# Patient Record
Sex: FEMALE | Race: ASIAN | NOT HISPANIC OR LATINO | Employment: UNEMPLOYED | URBAN - METROPOLITAN AREA
[De-identification: names, ages, dates, MRNs, and addresses within clinical notes are randomized per-mention and may not be internally consistent; named-entity substitution may affect disease eponyms.]

---

## 2022-12-03 ENCOUNTER — HOSPITAL ENCOUNTER (EMERGENCY)
Facility: MEDICAL CENTER | Age: 37
End: 2022-12-03
Attending: EMERGENCY MEDICINE
Payer: COMMERCIAL

## 2022-12-03 VITALS
TEMPERATURE: 99.6 F | HEIGHT: 63 IN | WEIGHT: 130.95 LBS | SYSTOLIC BLOOD PRESSURE: 122 MMHG | RESPIRATION RATE: 16 BRPM | BODY MASS INDEX: 23.2 KG/M2 | HEART RATE: 91 BPM | DIASTOLIC BLOOD PRESSURE: 82 MMHG | OXYGEN SATURATION: 100 %

## 2022-12-03 DIAGNOSIS — H66.90 ACUTE OTITIS MEDIA, UNSPECIFIED OTITIS MEDIA TYPE: ICD-10-CM

## 2022-12-03 DIAGNOSIS — J02.9 SORE THROAT: ICD-10-CM

## 2022-12-03 DIAGNOSIS — J02.0 STREP PHARYNGITIS: ICD-10-CM

## 2022-12-03 LAB
FLUAV RNA SPEC QL NAA+PROBE: NEGATIVE
FLUBV RNA SPEC QL NAA+PROBE: NEGATIVE
RSV RNA SPEC QL NAA+PROBE: NEGATIVE
S PYO DNA SPEC NAA+PROBE: DETECTED
SARS-COV-2 RNA RESP QL NAA+PROBE: NOTDETECTED
SPECIMEN SOURCE: NORMAL

## 2022-12-03 PROCEDURE — A9270 NON-COVERED ITEM OR SERVICE: HCPCS | Performed by: EMERGENCY MEDICINE

## 2022-12-03 PROCEDURE — C9803 HOPD COVID-19 SPEC COLLECT: HCPCS | Performed by: EMERGENCY MEDICINE

## 2022-12-03 PROCEDURE — C9803 HOPD COVID-19 SPEC COLLECT: HCPCS

## 2022-12-03 PROCEDURE — 99283 EMERGENCY DEPT VISIT LOW MDM: CPT

## 2022-12-03 PROCEDURE — 0241U HCHG SARS-COV-2 COVID-19 NFCT DS RESP RNA 4 TRGT MIC: CPT

## 2022-12-03 PROCEDURE — 87651 STREP A DNA AMP PROBE: CPT

## 2022-12-03 PROCEDURE — 700102 HCHG RX REV CODE 250 W/ 637 OVERRIDE(OP): Performed by: EMERGENCY MEDICINE

## 2022-12-03 RX ORDER — IBUPROFEN 600 MG/1
600 TABLET ORAL ONCE
Status: COMPLETED | OUTPATIENT
Start: 2022-12-03 | End: 2022-12-03

## 2022-12-03 RX ORDER — AMOXICILLIN 500 MG/1
500 CAPSULE ORAL 3 TIMES DAILY
Qty: 30 CAPSULE | Refills: 0 | Status: SHIPPED | OUTPATIENT
Start: 2022-12-03 | End: 2022-12-13

## 2022-12-03 RX ORDER — AMOXICILLIN 250 MG/1
500 CAPSULE ORAL ONCE
Status: COMPLETED | OUTPATIENT
Start: 2022-12-03 | End: 2022-12-03

## 2022-12-03 RX ADMIN — IBUPROFEN 600 MG: 600 TABLET, FILM COATED ORAL at 20:52

## 2022-12-03 RX ADMIN — AMOXICILLIN 500 MG: 250 CAPSULE ORAL at 20:52

## 2022-12-04 NOTE — ED NOTES
Pt given PO med's  Taken well  Cleared for d/c  dischg instructions given to pt  Verbally understands  Aware that Rx amoxcillin was sent to listed pharmacy  She is to  in AM and take as prescribed   Instructed to f/u w/ PCP as needed as well  D/c'ed to home in NAD

## 2022-12-04 NOTE — DISCHARGE INSTRUCTIONS
Return to the ER for any worsening sore throat, worsening pain on one side of your throat, difficulty swallowing fluids or saliva, muffling of your voice, difficulty breathing, fevers over 100.4, shaking chills, worsening headache, stiff neck, rash, nausea, vomiting, or for any concerns.    Follow-up with the [facility] early this coming week.  Please call for appointment.    Take over-the-counter Tylenol and ibuprofen for discomfort.     Drink plenty of fluids to stay well-hydrated.

## 2022-12-04 NOTE — ED PROVIDER NOTES
"ED Provider Note    CHIEF COMPLAINT  Chief Complaint   Patient presents with    Sore Throat     Reports fever T max 102, sore throat, L ear pain. Denies cough, vomiting, or diarrhea. Reports known sick contacts.       HPI  Sergio Siegel is a 37 y.o. female who presents to the ER complaining of left ear pain and sore throat.  Patient states that 2 days ago she developed fever of 102, sore throat and myalgias.  The fever and myalgias since resolved, but the sore throat persist.  Yesterday she developed left ear pain.  She had intermittent headache.  No cough.  No runny nose.  Her nieces came up for Thanksgiving and were ill with upper respiratory symptoms.  Otherwise no exposures to ill contacts.  She has not vaccinated for influenza.  She has had her COVID vaccines but not her boosters.  No shortness of breath.  No rash.  No vomiting or diarrhea.  She is able to swallow fluids and saliva but it hurts to do so.    REVIEW OF SYSTEMS  See HPI for further details. All other systems are negative.    PAST MEDICAL HISTORY  Past Medical History:   Diagnosis Date    Patient denies medical problems        FAMILY HISTORY  History reviewed. No pertinent family history.    SOCIAL HISTORY  Social History     Socioeconomic History    Marital status:    Tobacco Use    Smoking status: Never    Smokeless tobacco: Never   Substance and Sexual Activity    Alcohol use: Not Currently    Drug use: Not Currently       SURGICAL HISTORY  History reviewed. No pertinent surgical history.    CURRENT MEDICATIONS  Home Medications       Reviewed by Yany Ahn R.N. (Registered Nurse) on 12/03/22 at 1948  Med List Status: Not Addressed     Medication Last Dose Status        Patient Ponce Taking any Medications                           ALLERGIES  No Known Allergies    PHYSICAL EXAM  VITAL SIGNS: BP (!) 140/104   Pulse 97   Temp 36.1 °C (97 °F) (Temporal)   Resp 16   Ht 1.6 m (5' 3\")   Wt 59.4 kg (130 lb 15.3 oz)   LMP " 10/10/2022 Comment: irregular  SpO2 100%   BMI 23.20 kg/m²      Constitutional: Well developed, well nourished; No acute distress; Non-toxic appearance.   HENT: Normocephalic, atraumatic; Bilateral external ears normal; mild erythema in the posterior oropharynx without any significant swelling.  No exudate.  Uvula is midline.  No stridor.  No drooling.  No trismus.  Right TM is clear.  Left TM is erythematous and bulging.  No tenderness over the mastoid.  Eyes: PERRL, EOMI, Conjunctiva normal. No discharge.   Neck:  Supple, nontender midline; No stridor; No nuchal rigidity.   Lymphatic: Enlarged and tender left anterior cervical lymphadenopathy noted.   Cardiovascular: Regular rate and rhythm without murmurs, rubs, or gallop.   Thorax & Lungs: No respiratory distress, breath sounds clear to auscultation bilaterally without wheezing, rales or rhonchi. Nontender chest wall. No crepitus or subcutaneous air  Abdomen: Soft, nontender, bowel sounds normal. No obvious masses; No pulsatile masses; no rebound, guarding, or peritoneal signs.   Skin: Good color; warm and dry without rash or petechia.  Back: Nontender, No CVA tenderness.   Extremities: Distal radial, dorsalis pedis, posterior tibial pulses are equal bilaterally; No edema; Nontender calves or saphenous, No cyanosis, No clubbing.   Musculoskeletal: Good range of motion in all major joints. No tenderness to palpation or major deformities noted.   Neurologic: Alert & oriented x 4, clear speech        COURSE & MEDICAL DECISION MAKING  Pertinent Labs & Imaging studies reviewed. (See chart for details)    Results for orders placed or performed during the hospital encounter of 12/03/22   CoV-2, FLU A/B, and RSV by PCR (2-4 Hours CEPHEID) : Collect NP swab in VTM    Specimen: Nasopharyngeal; Respirate   Result Value Ref Range    SARS-CoV-2 Source Nasal Swab    Group A Strep by PCR    Specimen: Throat   Result Value Ref Range    Group A Strep by PCR DETECTED (A) Not  Detected          Patient presents to the ER complaining of sore throat and left ear pain.  Symptoms began 2 days ago with myalgias and fever.  The myalgias and fever have since resolved.  Sore throat and left ear pain persist.  She has intermittent headaches, but no stiff neck, and again no fever in the last 24 hours.  No concern for meningitis.  Her left TM is infected.  Right TM is clear.  Oropharynx reveals some mild erythema without any significant swelling or exudate.  Rapid strep, flu/RSV/COVID test were obtained.  Rapid strep is positive. She will need antibiotics for her ear infection.  She will go home with prescription for amoxicillin.  She denies possibility of pregnancy.  She is tolerating p.o. fluids and she is eating and drinking normally.  At this time I think she is safe and stable for outpatient management discharge home with prescription for amoxicillin and instructions to take Tylenol and ibuprofen for the ear discomfort.  She is not septic or toxic.  She is well-hydrated.  Vital signs are normal stable.  She is afebrile here in the emergency department.  She will be given referral to primary care and has been instructed to follow-up with primary care early this coming week for recheck.  At this time I think the patient is safe and stable for outpatient management discharge home.  She has been given strict return precautions and discharge instructions and she understands treatment plan and follow-up.    FINAL IMPRESSION  1. Acute otitis media, unspecified otitis media type Acute       2. Sore throat Acute       3. Strep pharyngitis Acute             This dictation has been created using voice recognition software. The accuracy of the dictation is limited by the abilities of the software. I expect there may be some errors of grammar and possibly content. I made every attempt to manually correct the errors within my dictation. However, errors related to voice recognition software may still exist  and should be interpreted within the appropriate context.     Electronically signed by: Paty Galloway M.D., 12/3/2022 7:56 PM

## 2022-12-06 ENCOUNTER — OFFICE VISIT (OUTPATIENT)
Dept: MEDICAL GROUP | Facility: PHYSICIAN GROUP | Age: 37
End: 2022-12-06
Payer: COMMERCIAL

## 2022-12-06 VITALS
DIASTOLIC BLOOD PRESSURE: 76 MMHG | WEIGHT: 130.25 LBS | HEART RATE: 99 BPM | SYSTOLIC BLOOD PRESSURE: 112 MMHG | RESPIRATION RATE: 16 BRPM | TEMPERATURE: 98.4 F | OXYGEN SATURATION: 96 % | BODY MASS INDEX: 23.08 KG/M2 | HEIGHT: 63 IN

## 2022-12-06 DIAGNOSIS — H66.002 ACUTE SUPPURATIVE OTITIS MEDIA OF LEFT EAR WITHOUT SPONTANEOUS RUPTURE OF TYMPANIC MEMBRANE, RECURRENCE NOT SPECIFIED: ICD-10-CM

## 2022-12-06 DIAGNOSIS — Z13.6 SCREENING FOR CARDIOVASCULAR CONDITION: ICD-10-CM

## 2022-12-06 DIAGNOSIS — Z13.29 SCREENING FOR ENDOCRINE DISORDER: ICD-10-CM

## 2022-12-06 DIAGNOSIS — Z23 NEED FOR VACCINATION: ICD-10-CM

## 2022-12-06 PROBLEM — H92.02 LEFT EAR PAIN: Status: ACTIVE | Noted: 2022-12-06

## 2022-12-06 PROCEDURE — 90686 IIV4 VACC NO PRSV 0.5 ML IM: CPT | Performed by: PHYSICIAN ASSISTANT

## 2022-12-06 PROCEDURE — 99204 OFFICE O/P NEW MOD 45 MIN: CPT | Mod: 25 | Performed by: PHYSICIAN ASSISTANT

## 2022-12-06 PROCEDURE — 90471 IMMUNIZATION ADMIN: CPT | Performed by: PHYSICIAN ASSISTANT

## 2022-12-06 RX ORDER — AMOXICILLIN AND CLAVULANATE POTASSIUM 875; 125 MG/1; MG/1
1 TABLET, FILM COATED ORAL 2 TIMES DAILY
Qty: 20 TABLET | Refills: 0 | Status: SHIPPED | OUTPATIENT
Start: 2022-12-06 | End: 2022-12-16

## 2022-12-06 SDOH — ECONOMIC STABILITY: TRANSPORTATION INSECURITY
IN THE PAST 12 MONTHS, HAS THE LACK OF TRANSPORTATION KEPT YOU FROM MEDICAL APPOINTMENTS OR FROM GETTING MEDICATIONS?: NO

## 2022-12-06 SDOH — HEALTH STABILITY: PHYSICAL HEALTH: ON AVERAGE, HOW MANY MINUTES DO YOU ENGAGE IN EXERCISE AT THIS LEVEL?: 10 MIN

## 2022-12-06 SDOH — ECONOMIC STABILITY: FOOD INSECURITY: WITHIN THE PAST 12 MONTHS, YOU WORRIED THAT YOUR FOOD WOULD RUN OUT BEFORE YOU GOT MONEY TO BUY MORE.: SOMETIMES TRUE

## 2022-12-06 SDOH — ECONOMIC STABILITY: TRANSPORTATION INSECURITY
IN THE PAST 12 MONTHS, HAS LACK OF TRANSPORTATION KEPT YOU FROM MEETINGS, WORK, OR FROM GETTING THINGS NEEDED FOR DAILY LIVING?: NO

## 2022-12-06 SDOH — ECONOMIC STABILITY: INCOME INSECURITY: HOW HARD IS IT FOR YOU TO PAY FOR THE VERY BASICS LIKE FOOD, HOUSING, MEDICAL CARE, AND HEATING?: SOMEWHAT HARD

## 2022-12-06 SDOH — ECONOMIC STABILITY: HOUSING INSECURITY
IN THE LAST 12 MONTHS, WAS THERE A TIME WHEN YOU DID NOT HAVE A STEADY PLACE TO SLEEP OR SLEPT IN A SHELTER (INCLUDING NOW)?: NO

## 2022-12-06 SDOH — ECONOMIC STABILITY: FOOD INSECURITY: WITHIN THE PAST 12 MONTHS, THE FOOD YOU BOUGHT JUST DIDN'T LAST AND YOU DIDN'T HAVE MONEY TO GET MORE.: NEVER TRUE

## 2022-12-06 SDOH — HEALTH STABILITY: MENTAL HEALTH
STRESS IS WHEN SOMEONE FEELS TENSE, NERVOUS, ANXIOUS, OR CAN'T SLEEP AT NIGHT BECAUSE THEIR MIND IS TROUBLED. HOW STRESSED ARE YOU?: ONLY A LITTLE

## 2022-12-06 SDOH — ECONOMIC STABILITY: INCOME INSECURITY: IN THE LAST 12 MONTHS, WAS THERE A TIME WHEN YOU WERE NOT ABLE TO PAY THE MORTGAGE OR RENT ON TIME?: NO

## 2022-12-06 SDOH — ECONOMIC STABILITY: HOUSING INSECURITY: IN THE LAST 12 MONTHS, HOW MANY PLACES HAVE YOU LIVED?: 1

## 2022-12-06 SDOH — HEALTH STABILITY: PHYSICAL HEALTH: ON AVERAGE, HOW MANY DAYS PER WEEK DO YOU ENGAGE IN MODERATE TO STRENUOUS EXERCISE (LIKE A BRISK WALK)?: 1 DAY

## 2022-12-06 SDOH — ECONOMIC STABILITY: TRANSPORTATION INSECURITY
IN THE PAST 12 MONTHS, HAS LACK OF RELIABLE TRANSPORTATION KEPT YOU FROM MEDICAL APPOINTMENTS, MEETINGS, WORK OR FROM GETTING THINGS NEEDED FOR DAILY LIVING?: NO

## 2022-12-06 ASSESSMENT — SOCIAL DETERMINANTS OF HEALTH (SDOH)
HOW OFTEN DO YOU GET TOGETHER WITH FRIENDS OR RELATIVES?: NEVER
HOW HARD IS IT FOR YOU TO PAY FOR THE VERY BASICS LIKE FOOD, HOUSING, MEDICAL CARE, AND HEATING?: SOMEWHAT HARD
HOW MANY DRINKS CONTAINING ALCOHOL DO YOU HAVE ON A TYPICAL DAY WHEN YOU ARE DRINKING: PATIENT DOES NOT DRINK
IN A TYPICAL WEEK, HOW MANY TIMES DO YOU TALK ON THE PHONE WITH FAMILY, FRIENDS, OR NEIGHBORS?: MORE THAN THREE TIMES A WEEK
HOW OFTEN DO YOU ATTEND CHURCH OR RELIGIOUS SERVICES?: NEVER
HOW OFTEN DO YOU HAVE A DRINK CONTAINING ALCOHOL: NEVER
DO YOU BELONG TO ANY CLUBS OR ORGANIZATIONS SUCH AS CHURCH GROUPS UNIONS, FRATERNAL OR ATHLETIC GROUPS, OR SCHOOL GROUPS?: NO
HOW OFTEN DO YOU HAVE SIX OR MORE DRINKS ON ONE OCCASION: NEVER
IN A TYPICAL WEEK, HOW MANY TIMES DO YOU TALK ON THE PHONE WITH FAMILY, FRIENDS, OR NEIGHBORS?: MORE THAN THREE TIMES A WEEK
WITHIN THE PAST 12 MONTHS, YOU WORRIED THAT YOUR FOOD WOULD RUN OUT BEFORE YOU GOT THE MONEY TO BUY MORE: SOMETIMES TRUE
HOW OFTEN DO YOU ATTENT MEETINGS OF THE CLUB OR ORGANIZATION YOU BELONG TO?: NEVER
HOW OFTEN DO YOU GET TOGETHER WITH FRIENDS OR RELATIVES?: NEVER
DO YOU BELONG TO ANY CLUBS OR ORGANIZATIONS SUCH AS CHURCH GROUPS UNIONS, FRATERNAL OR ATHLETIC GROUPS, OR SCHOOL GROUPS?: NO
HOW OFTEN DO YOU ATTEND CHURCH OR RELIGIOUS SERVICES?: NEVER
HOW OFTEN DO YOU ATTENT MEETINGS OF THE CLUB OR ORGANIZATION YOU BELONG TO?: NEVER

## 2022-12-06 ASSESSMENT — ENCOUNTER SYMPTOMS
CHILLS: 0
WEIGHT LOSS: 0
SORE THROAT: 0
FALLS: 0
BRUISES/BLEEDS EASILY: 0
DEPRESSION: 0
HEADACHES: 0
FEVER: 0
BLURRED VISION: 0
MYALGIAS: 0
NAUSEA: 0
WEAKNESS: 0
VOMITING: 0
DIARRHEA: 0
SHORTNESS OF BREATH: 0
ORTHOPNEA: 0
COUGH: 0
NERVOUS/ANXIOUS: 0
PALPITATIONS: 0
DIAPHORESIS: 0
DIZZINESS: 0
ABDOMINAL PAIN: 0

## 2022-12-06 ASSESSMENT — LIFESTYLE VARIABLES
HOW MANY STANDARD DRINKS CONTAINING ALCOHOL DO YOU HAVE ON A TYPICAL DAY: PATIENT DOES NOT DRINK
SKIP TO QUESTIONS 9-10: 1
HOW OFTEN DO YOU HAVE SIX OR MORE DRINKS ON ONE OCCASION: NEVER
HOW OFTEN DO YOU HAVE A DRINK CONTAINING ALCOHOL: NEVER
AUDIT-C TOTAL SCORE: 0

## 2022-12-06 ASSESSMENT — PATIENT HEALTH QUESTIONNAIRE - PHQ9: CLINICAL INTERPRETATION OF PHQ2 SCORE: 0

## 2022-12-06 NOTE — LETTER
Replaced by [facility]  Elizabeth Dillon P.A.-C.  [address]  [place] [address]  Fax: [phone]   Authorization for Release/Disclosure of   Protected Health Information   Name: SERGIO SIEGEL : 1985 SSN: [id number]   Address: [address] [place] [address]  [place] [address] Phone:    [phone] (home)    I authorize the entity listed below to release/disclose the PHI below to:   Replaced by [facility]/Elizabeth Dillon P.A.-C. and Elizabeth Dillon P.A.-C.   Provider or Entity Name:     Address   City, State, Zip   Phone:      Fax:     Reason for request: continuity of care   Information to be released:    [  ] LAST COLONOSCOPY,  including any PATH REPORT and follow-up  [  ] LAST FIT/COLOGUARD RESULT [  ] LAST DEXA  [  ] LAST MAMMOGRAM  [  ] LAST PAP  [  ] LAST LABS [  ] RETINA EXAM REPORT  [  ] IMMUNIZATION RECORDS  [  ] Release all info      [  ] Check here and initial the line next to each item to release ALL health information INCLUDING  _____ Care and treatment for drug and / or alcohol abuse  _____ HIV testing, infection status, or AIDS  _____ Genetic Testing    DATES OF SERVICE OR TIME PERIOD TO BE DISCLOSED: _____________  I understand and acknowledge that:  * This Authorization may be revoked at any time by you in writing, except if your health information has already been used or disclosed.  * Your health information that will be used or disclosed as a result of you signing this authorization could be re-disclosed by the recipient. If this occurs, your re-disclosed health information may no longer be protected by State or Federal laws.  * You may refuse to sign this Authorization. Your refusal will not affect your ability to obtain treatment.  * This Authorization becomes effective upon signing and will  on (date) __________.      If no date is indicated, this Authorization will  one (1) year from the signature date.    Name: Sergio Siegel    Signature:   Date:     2022       PLEASE FAX  REQUESTED RECORDS BACK TO: [phone]

## 2022-12-06 NOTE — PROGRESS NOTES
"Subjective:     CC: Establish as a new patient, left ear pain    HPI:   Sergio presents today with    Problem   Left Ear Pain    Acute, stable.  Started 4 days ago.  Seen in ER, given amoxicillin.  Better but still hurting.  Having some bloody drainage.  Difficult hearing.  Tested +strep, throat is feeling feeling better.         Health Maintenance: Completed    ROS:  Review of Systems   Constitutional:  Negative for chills, diaphoresis, fever, malaise/fatigue and weight loss.   HENT:  Positive for ear pain (Left). Negative for hearing loss and sore throat.    Eyes:  Negative for blurred vision.   Respiratory:  Negative for cough and shortness of breath.    Cardiovascular:  Negative for chest pain, palpitations, orthopnea and leg swelling.   Gastrointestinal:  Negative for abdominal pain, diarrhea, nausea and vomiting.   Genitourinary:  Negative for dysuria, frequency, hematuria and urgency.   Musculoskeletal:  Negative for falls, joint pain and myalgias.   Skin:  Negative for rash.   Neurological:  Negative for dizziness, weakness and headaches.   Endo/Heme/Allergies:  Does not bruise/bleed easily.   Psychiatric/Behavioral:  Negative for depression. The patient is not nervous/anxious.      Objective:     Exam:  /76 (BP Location: Right arm, Patient Position: Sitting, BP Cuff Size: Adult)   Pulse 99   Temp 36.9 °C (98.4 °F) (Temporal)   Resp 16   Ht 1.6 m (5' 3\")   Wt 59.1 kg (130 lb 4 oz)   LMP 12/05/2022 (Exact Date)   SpO2 96%   Breastfeeding No   BMI 23.07 kg/m²  Body mass index is 23.07 kg/m².    Physical Exam  Constitutional:       Appearance: Normal appearance.   HENT:      Head: Normocephalic and atraumatic.      Right Ear: Tympanic membrane, ear canal and external ear normal.      Ears:      Comments: Left TM is markedly erythematous with loss of landmarks.  Small amount of bright red blood is noted in the left EAC without surrounding edema, erythema, or drainage.     Mouth/Throat:      " Mouth: Mucous membranes are moist.      Pharynx: Oropharynx is clear. No oropharyngeal exudate or posterior oropharyngeal erythema.   Eyes:      Extraocular Movements: Extraocular movements intact.      Conjunctiva/sclera: Conjunctivae normal.      Pupils: Pupils are equal, round, and reactive to light.   Cardiovascular:      Rate and Rhythm: Normal rate and regular rhythm.      Heart sounds: Normal heart sounds.   Pulmonary:      Effort: Pulmonary effort is normal.      Breath sounds: Normal breath sounds.   Abdominal:      General: Abdomen is flat. Bowel sounds are normal. There is no distension.      Palpations: Abdomen is soft. There is no mass.      Tenderness: There is no abdominal tenderness. There is no guarding.   Musculoskeletal:      Cervical back: Normal range of motion and neck supple.   Skin:     General: Skin is warm and dry.   Neurological:      General: No focal deficit present.      Mental Status: She is alert and oriented to person, place, and time.   Psychiatric:         Mood and Affect: Mood normal.           Assessment & Plan:     37 y.o. female with the following -     1. Acute suppurative otitis media of left ear without spontaneous rupture of tympanic membrane, recurrence not specified  Acute, stable.  Stop amoxicillin 500 mg 3 times a day.  Start Augmentin 875/125, twice daily for 10 days.  Follow-up for reevaluation after completion of antibiotics.    - amoxicillin-clavulanate (AUGMENTIN) 875-125 MG Tab; Take 1 Tablet by mouth 2 times a day for 10 days.  Dispense: 20 Tablet; Refill: 0  - CBC WITH DIFFERENTIAL; Future  - Comp Metabolic Panel; Future    2. Screening for cardiovascular condition    - Lipid Profile; Future    3. Screening for endocrine disorder    - TSH WITH REFLEX TO FT4; Future    4. Need for vaccination    - INFLUENZA VACCINE QUAD INJ (PF)        Return in about 1 month (around 1/6/2023) for discuss labs, med check.    Please note that this dictation was created using voice  recognition software. I have made every reasonable attempt to correct obvious errors, but I expect that there are errors of grammar and possibly content that I did not discover before finalizing the note.

## 2023-01-20 ENCOUNTER — HOSPITAL ENCOUNTER (OUTPATIENT)
Dept: LAB | Facility: MEDICAL CENTER | Age: 38
End: 2023-01-20
Attending: PHYSICIAN ASSISTANT

## 2023-01-20 DIAGNOSIS — H66.002 ACUTE SUPPURATIVE OTITIS MEDIA OF LEFT EAR WITHOUT SPONTANEOUS RUPTURE OF TYMPANIC MEMBRANE, RECURRENCE NOT SPECIFIED: ICD-10-CM

## 2023-01-20 DIAGNOSIS — Z13.6 SCREENING FOR CARDIOVASCULAR CONDITION: ICD-10-CM

## 2023-01-20 DIAGNOSIS — Z13.29 SCREENING FOR ENDOCRINE DISORDER: ICD-10-CM

## 2023-01-20 LAB
ALBUMIN SERPL BCP-MCNC: 4.3 G/DL (ref 3.2–4.9)
ALBUMIN/GLOB SERPL: 1.3 G/DL
ALP SERPL-CCNC: 59 U/L (ref 30–99)
ALT SERPL-CCNC: 13 U/L (ref 2–50)
ANION GAP SERPL CALC-SCNC: 11 MMOL/L (ref 7–16)
AST SERPL-CCNC: 19 U/L (ref 12–45)
BASOPHILS # BLD AUTO: 0.3 % (ref 0–1.8)
BASOPHILS # BLD: 0.02 K/UL (ref 0–0.12)
BILIRUB SERPL-MCNC: 0.2 MG/DL (ref 0.1–1.5)
BUN SERPL-MCNC: 14 MG/DL (ref 8–22)
CALCIUM ALBUM COR SERPL-MCNC: 9.1 MG/DL (ref 8.5–10.5)
CALCIUM SERPL-MCNC: 9.3 MG/DL (ref 8.5–10.5)
CHLORIDE SERPL-SCNC: 106 MMOL/L (ref 96–112)
CHOLEST SERPL-MCNC: 150 MG/DL (ref 100–199)
CO2 SERPL-SCNC: 22 MMOL/L (ref 20–33)
CREAT SERPL-MCNC: 0.65 MG/DL (ref 0.5–1.4)
EOSINOPHIL # BLD AUTO: 0.08 K/UL (ref 0–0.51)
EOSINOPHIL NFR BLD: 1.3 % (ref 0–6.9)
ERYTHROCYTE [DISTWIDTH] IN BLOOD BY AUTOMATED COUNT: 41.5 FL (ref 35.9–50)
FASTING STATUS PATIENT QL REPORTED: NORMAL
GFR SERPLBLD CREATININE-BSD FMLA CKD-EPI: 116 ML/MIN/1.73 M 2
GLOBULIN SER CALC-MCNC: 3.2 G/DL (ref 1.9–3.5)
GLUCOSE SERPL-MCNC: 84 MG/DL (ref 65–99)
HCT VFR BLD AUTO: 41.2 % (ref 37–47)
HDLC SERPL-MCNC: 70 MG/DL
HGB BLD-MCNC: 13 G/DL (ref 12–16)
IMM GRANULOCYTES # BLD AUTO: 0.02 K/UL (ref 0–0.11)
IMM GRANULOCYTES NFR BLD AUTO: 0.3 % (ref 0–0.9)
LDLC SERPL CALC-MCNC: 69 MG/DL
LYMPHOCYTES # BLD AUTO: 1.95 K/UL (ref 1–4.8)
LYMPHOCYTES NFR BLD: 32 % (ref 22–41)
MCH RBC QN AUTO: 25.3 PG (ref 27–33)
MCHC RBC AUTO-ENTMCNC: 31.6 G/DL (ref 33.6–35)
MCV RBC AUTO: 80.2 FL (ref 81.4–97.8)
MONOCYTES # BLD AUTO: 0.36 K/UL (ref 0–0.85)
MONOCYTES NFR BLD AUTO: 5.9 % (ref 0–13.4)
NEUTROPHILS # BLD AUTO: 3.67 K/UL (ref 2–7.15)
NEUTROPHILS NFR BLD: 60.2 % (ref 44–72)
NRBC # BLD AUTO: 0 K/UL
NRBC BLD-RTO: 0 /100 WBC
PLATELET # BLD AUTO: 273 K/UL (ref 164–446)
PMV BLD AUTO: 11.7 FL (ref 9–12.9)
POTASSIUM SERPL-SCNC: 4.6 MMOL/L (ref 3.6–5.5)
PROT SERPL-MCNC: 7.5 G/DL (ref 6–8.2)
RBC # BLD AUTO: 5.14 M/UL (ref 4.2–5.4)
SODIUM SERPL-SCNC: 139 MMOL/L (ref 135–145)
TRIGL SERPL-MCNC: 56 MG/DL (ref 0–149)
TSH SERPL DL<=0.005 MIU/L-ACNC: 3.22 UIU/ML (ref 0.38–5.33)
WBC # BLD AUTO: 6.1 K/UL (ref 4.8–10.8)

## 2023-01-20 PROCEDURE — 85025 COMPLETE CBC W/AUTO DIFF WBC: CPT

## 2023-01-20 PROCEDURE — 80053 COMPREHEN METABOLIC PANEL: CPT

## 2023-01-20 PROCEDURE — 80061 LIPID PANEL: CPT

## 2023-01-20 PROCEDURE — 36415 COLL VENOUS BLD VENIPUNCTURE: CPT

## 2023-01-20 PROCEDURE — 84443 ASSAY THYROID STIM HORMONE: CPT

## 2023-12-01 ENCOUNTER — OFFICE VISIT (OUTPATIENT)
Dept: URGENT CARE | Facility: CLINIC | Age: 38
End: 2023-12-01
Payer: COMMERCIAL

## 2023-12-01 VITALS
TEMPERATURE: 97.7 F | OXYGEN SATURATION: 99 % | DIASTOLIC BLOOD PRESSURE: 84 MMHG | HEART RATE: 103 BPM | HEIGHT: 62 IN | WEIGHT: 140.6 LBS | RESPIRATION RATE: 16 BRPM | BODY MASS INDEX: 25.88 KG/M2 | SYSTOLIC BLOOD PRESSURE: 112 MMHG

## 2023-12-01 DIAGNOSIS — J06.9 UPPER RESPIRATORY TRACT INFECTION, UNSPECIFIED TYPE: ICD-10-CM

## 2023-12-01 LAB — S PYO DNA SPEC NAA+PROBE: NOT DETECTED

## 2023-12-01 PROCEDURE — 3079F DIAST BP 80-89 MM HG: CPT

## 2023-12-01 PROCEDURE — 99213 OFFICE O/P EST LOW 20 MIN: CPT

## 2023-12-01 PROCEDURE — 87651 STREP A DNA AMP PROBE: CPT

## 2023-12-01 PROCEDURE — 3074F SYST BP LT 130 MM HG: CPT

## 2023-12-01 RX ORDER — ACETAMINOPHEN 325 MG/1
650 TABLET ORAL EVERY 4 HOURS PRN
COMMUNITY

## 2023-12-01 RX ORDER — BENZONATATE 100 MG/1
100 CAPSULE ORAL 3 TIMES DAILY PRN
Qty: 30 CAPSULE | Refills: 0 | Status: SHIPPED | OUTPATIENT
Start: 2023-12-01 | End: 2023-12-11

## 2023-12-01 RX ORDER — DEXTROMETHORPHAN HYDROBROMIDE AND PROMETHAZINE HYDROCHLORIDE 15; 6.25 MG/5ML; MG/5ML
5 SYRUP ORAL EVERY 6 HOURS PRN
Qty: 100 ML | Refills: 0 | Status: SHIPPED | OUTPATIENT
Start: 2023-12-01

## 2023-12-01 ASSESSMENT — FIBROSIS 4 INDEX: FIB4 SCORE: 0.73

## 2023-12-01 NOTE — PROGRESS NOTES
"Chief Complaint   Patient presents with    Coronavirus Screening     Took an at-home Covid test and was positive.  Hi , I did Covid test at home in the morning. Starting feeling sick November 23 with symptoms of coughing, sore throat, fever at night, body aches, fatigue.         Subjective:   HISTORY OF PRESENT ILLNESS: Sergio Siegel is a 38 y.o. female who presents for cough and sore throat.  Tested positive for covid this morning but has had symptoms since the 23rd. She reports ongoing sore throat and fevers.   Patient denies inability to swallow, voice changes, neck pain or difficulty opening his mouth.       Medications, Allergies, current problem list, Social and Family history reviewed today in Epic.     Objective:     /84 (BP Location: Left arm, Patient Position: Sitting, BP Cuff Size: Adult)   Pulse (!) 103   Temp 36.5 °C (97.7 °F) (Temporal)   Resp 16   Ht 1.58 m (5' 2.21\")   Wt 63.8 kg (140 lb 9.6 oz)   SpO2 99%     Physical Exam  Vitals reviewed.   Constitutional:       Appearance: Normal appearance. She is not toxic-appearing.   HENT:      Head:      Jaw: No trismus.      Right Ear: Tympanic membrane normal.      Left Ear: Tympanic membrane normal.      Nose: Rhinorrhea present.      Mouth/Throat:      Mouth: Mucous membranes are moist.      Pharynx: Uvula midline. Posterior oropharyngeal erythema present. No pharyngeal swelling, oropharyngeal exudate or uvula swelling.      Tonsils: No tonsillar exudate or tonsillar abscesses. 1+ on the right. 1+ on the left.      Comments: No muffled voice, trismus, unilateral deviation of the uvula, soft palate fullness or edema. No oral airway compromise, or drooling noted.   Eyes:      Conjunctiva/sclera: Conjunctivae normal.   Cardiovascular:      Rate and Rhythm: Normal rate and regular rhythm.      Heart sounds: Normal heart sounds.   Pulmonary:      Effort: Pulmonary effort is normal. No respiratory distress.      Breath sounds: Normal breath " sounds. No decreased breath sounds or wheezing.   Musculoskeletal:      Cervical back: Full passive range of motion without pain and neck supple.   Skin:     General: Skin is warm and dry.   Neurological:      Mental Status: She is alert and oriented to person, place, and time.   Psychiatric:         Mood and Affect: Mood normal.          Assessment/Plan:     Diagnosis and associated orders    I personally reviewed prior external notes and test results pertinent to today's visit.     1. Upper respiratory tract infection, unspecified type  POCT CEPHEID GROUP A STREP - PCR            IMPRESSION:  Exam findings reassuring with stable vital signs, No red flag symptoms or exam findings. Strep testing is negative.  Explained that S/S are consistent with a viral illness and are self limiting.  No antibiotics are indicated at this time.     OTC symptomatic relief measures were recommended.  Supportive care also discussed to include the use of warm salt water gargles, saline nasal rinses, steam inhalation, and the use of a cool-mist humidifier in the bedroom at night.      Differential diagnosis discussed. Pt was Educated on red flag symptoms. Pt has been Instructed to return to Urgent Care or nearest Emergency Department if symptoms fail to improve, for any change in condition, further concerns, or new concerning symptoms. Patient states understanding of the plan of care and discharge instructions.  They are discharged in stable condition.         Please note that this dictation was created using voice recognition software. I have made a reasonable attempt to correct obvious errors, but I expect that there are errors of grammar and possibly content that I did not discover before finalizing the note.    This note was electronically signed by TERELL Barton

## 2024-11-04 ENCOUNTER — OFFICE VISIT (OUTPATIENT)
Dept: MEDICAL GROUP | Facility: PHYSICIAN GROUP | Age: 39
End: 2024-11-04
Payer: COMMERCIAL

## 2024-11-04 VITALS
OXYGEN SATURATION: 97 % | DIASTOLIC BLOOD PRESSURE: 72 MMHG | HEIGHT: 62 IN | BODY MASS INDEX: 26.68 KG/M2 | SYSTOLIC BLOOD PRESSURE: 106 MMHG | HEART RATE: 90 BPM | WEIGHT: 145 LBS | TEMPERATURE: 97.4 F | RESPIRATION RATE: 18 BRPM

## 2024-11-04 DIAGNOSIS — G44.219 EPISODIC TENSION-TYPE HEADACHE, NOT INTRACTABLE: ICD-10-CM

## 2024-11-04 DIAGNOSIS — Z23 NEED FOR VACCINATION: ICD-10-CM

## 2024-11-04 DIAGNOSIS — N83.209 CYST OF OVARY, UNSPECIFIED LATERALITY: ICD-10-CM

## 2024-11-04 PROCEDURE — 3074F SYST BP LT 130 MM HG: CPT | Performed by: PHYSICIAN ASSISTANT

## 2024-11-04 PROCEDURE — 3078F DIAST BP <80 MM HG: CPT | Performed by: PHYSICIAN ASSISTANT

## 2024-11-04 PROCEDURE — 90656 IIV3 VACC NO PRSV 0.5 ML IM: CPT | Performed by: PHYSICIAN ASSISTANT

## 2024-11-04 PROCEDURE — 99213 OFFICE O/P EST LOW 20 MIN: CPT | Mod: 25 | Performed by: PHYSICIAN ASSISTANT

## 2024-11-04 PROCEDURE — 90471 IMMUNIZATION ADMIN: CPT | Performed by: PHYSICIAN ASSISTANT

## 2024-11-04 RX ORDER — SUMATRIPTAN 50 MG/1
50 TABLET, FILM COATED ORAL
Qty: 10 TABLET | Refills: 3 | Status: SHIPPED | OUTPATIENT
Start: 2024-11-04

## 2024-11-04 ASSESSMENT — PATIENT HEALTH QUESTIONNAIRE - PHQ9: CLINICAL INTERPRETATION OF PHQ2 SCORE: 0

## 2024-11-04 ASSESSMENT — FIBROSIS 4 INDEX: FIB4 SCORE: 0.73

## 2024-11-04 NOTE — PROGRESS NOTES
"Verbal consent was acquired by the patient to use [employer] ambient listening note generation during this visit     Subjective:     HPI:   History of Present Illness  The patient is a 38-year-old female here to discuss headaches and lower abdominal pain.    She experiences monthly headaches, typically coinciding with her menstrual cycle. These headaches, which last for the duration of her period, are bilateral and occasionally shift from one side to the other after medication. She manages the pain with ibuprofen and sometimes uses migraine-specific medication, which provides inconsistent relief. She does not experience nausea with these headaches. She has a history of using birth control pills for an ovarian cyst but discontinued them a year later due to weight gain. Interestingly, she did not experience headaches while on birth control.    She began experiencing lower abdominal pain three days ago, in the same area where she had surgery for a leiomyoma 13 years ago. An ultrasound conducted three years ago revealed an ovarian cyst, which occasionally causes discomfort during her periods. She has been taking ibuprofen for the pain. The current pain feels similar to the cyst pain. She was advised to have an ultrasound every six months but has not followed this recommendation. The pain is severe enough to prevent her from standing at work. She does not currently have a gynecologist and is seeking a referral.    Health Maintenance: Completed    ROS:  Gen: no fevers/chills  Eyes: no changes in vision  ENT: no sore throat  Pulm: no sob, no cough  CV: no chest pain  GI: no nausea/vomiting  : no dysuria  MSk: no myalgias  Skin: no rash  Neuro: no headaches  Psych: no depression, no anxiety    Objective:     Exam:  /72   Pulse 90   Temp 36.3 °C (97.4 °F) (Temporal)   Resp 18   Ht 1.58 m (5' 2.21\")   Wt 65.8 kg (145 lb)   LMP 10/24/2024 (Exact Date)   SpO2 97%   BMI 26.34 kg/m²  Body mass index is 26.34 " kg/m².    PHYSICAL EXAM  Constitutional: Alert, no distress, well-groomed.  Skin: Warm, dry, good turgor, no rashes in visible areas.  Eye: Equal, round and reactive, conjunctiva clear, lids normal.  ENMT: Lips without lesions, good dentition, moist mucous membranes.  Neck: Trachea midline, no masses, no thyromegaly.  Respiratory: Unlabored respiratory effort, no cough.  MSK: Normal gait, moves all extremities.  Abdomen: bowel sounds present, soft, nondistended, mildly tender lower quadrants, negative McBurney,  no peritoneal signs, no CVA tenderness bilaterally, no hepatosplenomegaly  Neuro: Grossly non-focal.   Psych: Alert and oriented x3, normal affect and mood.    Results      Assessment & Plan:     1. Episodic tension-type headache, not intractable  SUMAtriptan (IMITREX) 50 MG Tab      2. Cyst of ovary, unspecified laterality  US-PELVIC COMPLETE (TRANSABDOMINAL/TRANSVAGINAL) (COMBO)    Referral to Gynecology      3. Need for vaccination  INFLUENZA VACCINE TRI INJ (PF)          Assessment & Plan  1. Headaches.  Chronic, not well-controlled.  The headaches are occurring cyclically around her menstrual period. She was advised to maintain adequate hydration and continue with ibuprofen for more mild symptoms. For more severe symptoms, a prescription for sumatriptan was provided, to be taken at the onset of a headache. If the headache persists after 2 hours, a second dose can be taken.  Educated about how to take the medication as well as potential side effects.  Birth control pills were discussed as a potential treatment option, but the patient prefers not to use them due to past side effects, though she previously did not experience these headaches while she was on OCPs.  Follow-up for new or worsening symptoms.    2. Lower abdominal pain.  Patient has a history of recurrent ovarian cysts and was supposed to be getting ultrasounds every 6 months.  Has been over a year and a half since her previous ultrasound so  updated imaging ordered today. A referral to a gynecologist was also made, and she was advised to schedule a Pap smear. If the ultrasound results are unremarkable, the source of the pain will need to be further investigated, but patient is in agreement with the plan and thinks this is related to ovarian cysts as well.  Will determine follow-up based on the results.            I spent a total of 25 minutes with record review, exam, communication with the patient, communication with other providers, and documentation of this encounter.    Please note that this dictation was created using voice recognition software. I have made every reasonable attempt to correct obvious errors, but I expect that there are errors of grammar and possibly content that I did not discover before finalizing the note.

## 2024-11-27 ENCOUNTER — HOSPITAL ENCOUNTER (OUTPATIENT)
Dept: RADIOLOGY | Facility: MEDICAL CENTER | Age: 39
End: 2024-11-27
Attending: PHYSICIAN ASSISTANT
Payer: COMMERCIAL

## 2024-11-27 DIAGNOSIS — N83.209 CYST OF OVARY, UNSPECIFIED LATERALITY: ICD-10-CM

## 2024-11-27 PROCEDURE — 76830 TRANSVAGINAL US NON-OB: CPT

## 2024-12-10 NOTE — PROGRESS NOTES
ANNUAL GYNECOLOGY VISIT    Chief Complaint  New Patient and Annual Exam      Subjective     Subjective  Sergio Siegel is a 39 y.o. female who presents today for Annual Exam. Her main concern today is pain during her menses. Her menses are regular, lasting five days. During her decribes 8/10 bilateral pelvic pain during her menses which she takes tylenol, motrin, and heating pads. Uses four pads on heaviest days. No pain between periods or with bowel movements. She had an US performed recently with a 1.8cm dominant follicle and she's concerned that is the cause of her pain. She's had hx of ovarian cysts which were being followed by US and she previously used OCPs for prevention, but discontinued. She also has hx of a abdominal myomectomy for a 6in fibroid performed in [facility]. Denies hx of VTE or HTN. Does have monthly headaches associated with her menses. Describes the pain as unilateral, but will shift between sides of her head. Denies nausea, but did have one episode where the pain was focused around her left eye and caused blurriness. Has no other symptoms of aura. Her headaches improved while taking OCPs previously.     Preventive Care   Immunization History   Administered Date(s) Administered    Cholera Vaccine - HISTORICAL DATA 03/15/2019    Hepatitis A Vaccine, Adult 03/15/2019, 10/10/2019    Influenza Vaccine Quad Inj (Pf) 12/14/2019, 12/06/2022    Influenza split virus trivalent (PF) 11/04/2024    Japanese Encephalitis 03/15/2019, 03/26/2019    MODERNA SARS-COV-2 VACCINE (12+) 05/21/2021, 06/21/2021    Tdap Vaccine 07/08/2016    Typhoid Vaccine (Oral) - HISTORICAL DATA 03/15/2019    Varicella Vaccine Live 07/08/2016     Gynecology History and ROS  Current Sexual Activity: Yes  History of sexually transmitted diseases?  no  Abnormal discharge? No  Current Contraception: none    Menstrual History  Patient's last menstrual period was 11/24/2024.  Periods are regular, lasting 5 days.   Clots or heavy flow:  No  Dysmenorrhea: Yes  Intermenstrual bleeding/spotting: No  Significant pain with periods:Yes  Bothersome PMS symptoms: No    Pap History  Last pap smear:  2019 wnl  History of moderate or severe dysplasia: No    Cancer Risk Assessement:  Family history of:   - Breast cancer: no   - Ovarian cancer: no   - Uterine cancer: no   - Colon cancer: no    Obstetric History  OB History    Para Term  AB Living   0 0 0 0 0 0   SAB IAB Ectopic Molar Multiple Live Births   0 0 0 0 0 0       Past Medical History  Past Medical History:   Diagnosis Date    Patient denies medical problems        Past Surgical History  Past Surgical History:   Procedure Laterality Date    MYOMECTOMY      around        Social History  Social History     Socioeconomic History    Marital status:      Spouse name: Not on file    Number of children: Not on file    Years of education: Not on file    Highest education level: Bachelor's degree (e.g., BA, AB, BS)   Occupational History    Not on file   Tobacco Use    Smoking status: Never    Smokeless tobacco: Never   Vaping Use    Vaping status: Never Used   Substance and Sexual Activity    Alcohol use: Yes     Comment: Socially    Drug use: Never    Sexual activity: Yes     Partners: Male     Birth control/protection: None   Other Topics Concern    Not on file   Social History Narrative    Not on file     Social Drivers of Health     Financial Resource Strain: Medium Risk (2022)    Overall Financial Resource Strain (CARDIA)     Difficulty of Paying Living Expenses: Somewhat hard   Food Insecurity: Food Insecurity Present (2022)    Hunger Vital Sign     Worried About Running Out of Food in the Last Year: Sometimes true     Ran Out of Food in the Last Year: Never true   Transportation Needs: No Transportation Needs (2022)    PRAPARE - Transportation     Lack of Transportation (Medical): No     Lack of Transportation (Non-Medical): No   Physical Activity: Insufficiently  Active (12/6/2022)    Exercise Vital Sign     Days of Exercise per Week: 1 day     Minutes of Exercise per Session: 10 min   Stress: No Stress Concern Present (12/6/2022)    [employer] [place] [employer] - Occupational Stress Questionnaire     Feeling of Stress : Only a little   Social Connections: Moderately Isolated (12/6/2022)    Social Connection and Isolation Panel [NHANES]     Frequency of Communication with Friends and Family: More than three times a week     Frequency of Social Gatherings with Friends and Family: Never     Attends Hinduism Services: Never     Active Member of Clubs or Organizations: No     Attends Club or Organization Meetings: Never     Marital Status:    Intimate Partner Violence: Not on file   Housing Stability: Low Risk  (12/6/2022)    Housing Stability Vital Sign     Unable to Pay for Housing in the Last Year: No     Number of Places Lived in the Last Year: 1     Unstable Housing in the Last Year: No       Family History  Family History   Problem Relation Age of Onset    Hypertension Mother     Hyperlipidemia Mother     Diabetes Mother     No Known Problems Father     No Known Problems Brother     No Known Problems Maternal Grandmother     Heart Disease Maternal Grandfather     No Known Problems Paternal Grandmother     No Known Problems Paternal Grandfather     Cancer Neg Hx     Ovarian Cancer Neg Hx     Tubal Cancer Neg Hx     Peritoneal Cancer Neg Hx     Colorectal Cancer Neg Hx     Breast Cancer Neg Hx     Bilateral Breast Cancer Neg Hx        Home Medications  Current Outpatient Medications on File Prior to Visit   Medication Sig Dispense Refill    SUMAtriptan (IMITREX) 50 MG Tab Take 1 Tablet by mouth one time as needed for Migraine for up to 1 dose. 10 Tablet 3    acetaminophen (TYLENOL) 325 MG Tab Take 650 mg by mouth every four hours as needed.       No current facility-administered medications on file prior to visit.       Allergies/Reactions  No Known  "Allergies    ROS  Review of Systems   All other systems reviewed and are negative.      Objective     Physical Examination:  Vital Signs:   Vitals:    12/11/24 0848   BP: 123/79   BP Location: Right arm   Patient Position: Sitting   BP Cuff Size: Adult   Weight: 143 lb   Height: 5' 3\"     Body mass index is 25.33 kg/m².    Physical Exam  Exam conducted with a chaperone present.   Constitutional:       General: She is not in acute distress.     Appearance: Normal appearance. She is normal weight.   Eyes:      Conjunctiva/sclera: Conjunctivae normal.   Pulmonary:      Effort: Pulmonary effort is normal.   Chest:   Breasts:     Right: Normal.      Left: Normal.   Abdominal:      General: Abdomen is flat. There is no distension.      Palpations: Abdomen is soft. There is no mass.      Tenderness: There is no abdominal tenderness.      Comments: Pfannenstiel incision well healed   Genitourinary:     General: Normal vulva.      Vagina: Normal.      Cervix: Normal.      Uterus: Normal. Not enlarged, not fixed and not tender.       Adnexa: Right adnexa normal and left adnexa normal.   Skin:     General: Skin is warm and dry.      Capillary Refill: Capillary refill takes less than 2 seconds.   Neurological:      General: No focal deficit present.      Mental Status: She is alert.   Psychiatric:         Mood and Affect: Mood normal.         Behavior: Behavior normal.         Assessment   Sergio Siegel is a 39 y.o. female who presents today for Annual Gyn Exam.     #Health Maintenance   - Pap: NILM, HPV neg (2019); collected today  - STI Screening: declined  - Mammogram: not indicated  - Colonoscopy: not indicated  - Dexa: not indicated  - Immunizations: s/p flu; s/p COVID x2  - Tobacco: no  - Diabetes screening: per PCP  - Cholesterol screening: per PCP  - Counseling: STD prevention and use and side effects of OCPs  - Contraception: none currently, Rx sent for OCPs    #Uterine fibroids  - Hx of abdominal myomectomy in " [facility] for 6in fibroid in 2010  - TVUS (11/2024): 1.5cm and 1.2cm fibroids in anterior uterine wall    #Dysmenorrhea  - Discussed that her level of pain is abnormal for menses. TVUS reviewed without any acute cause of her pain. Discussed that a dominant follicle is a normal US finding and that there were no other cysts discovered. Also discussed that her exam today was normal. Recommended hormonal management of her menses to better control her dysmenorrhea. Counseled regarding the various hormonal options, and ultimately the patient elected to trial OCPs.   - Blisovi sent to pharmacy    Return: Annually or PRN    Dina Mehta M.D.

## 2024-12-11 ENCOUNTER — GYNECOLOGY VISIT (OUTPATIENT)
Dept: OBGYN | Facility: CLINIC | Age: 39
End: 2024-12-11
Attending: PHYSICIAN ASSISTANT
Payer: COMMERCIAL

## 2024-12-11 ENCOUNTER — HOSPITAL ENCOUNTER (OUTPATIENT)
Facility: MEDICAL CENTER | Age: 39
End: 2024-12-11
Attending: STUDENT IN AN ORGANIZED HEALTH CARE EDUCATION/TRAINING PROGRAM
Payer: COMMERCIAL

## 2024-12-11 VITALS
WEIGHT: 143 LBS | BODY MASS INDEX: 25.34 KG/M2 | DIASTOLIC BLOOD PRESSURE: 79 MMHG | SYSTOLIC BLOOD PRESSURE: 123 MMHG | HEIGHT: 63 IN

## 2024-12-11 DIAGNOSIS — Z12.4 SCREENING FOR MALIGNANT NEOPLASM OF CERVIX PERFORMED: ICD-10-CM

## 2024-12-11 DIAGNOSIS — N94.6 DYSMENORRHEA: ICD-10-CM

## 2024-12-11 DIAGNOSIS — Z23 NEED FOR HPV VACCINATION: ICD-10-CM

## 2024-12-11 DIAGNOSIS — D25.9 UTERINE LEIOMYOMA, UNSPECIFIED LOCATION: ICD-10-CM

## 2024-12-11 DIAGNOSIS — Z30.09 ENCOUNTER FOR COUNSELING REGARDING CONTRACEPTION: ICD-10-CM

## 2024-12-11 DIAGNOSIS — Z01.419 WOMEN'S ANNUAL ROUTINE GYNECOLOGICAL EXAMINATION: Primary | ICD-10-CM

## 2024-12-11 PROCEDURE — 87591 N.GONORRHOEAE DNA AMP PROB: CPT

## 2024-12-11 PROCEDURE — 99385 PREV VISIT NEW AGE 18-39: CPT | Performed by: STUDENT IN AN ORGANIZED HEALTH CARE EDUCATION/TRAINING PROGRAM

## 2024-12-11 PROCEDURE — 88142 CYTOPATH C/V THIN LAYER: CPT

## 2024-12-11 PROCEDURE — 87491 CHLMYD TRACH DNA AMP PROBE: CPT

## 2024-12-11 PROCEDURE — 90471 IMMUNIZATION ADMIN: CPT

## 2024-12-11 PROCEDURE — 87624 HPV HI-RISK TYP POOLED RSLT: CPT

## 2024-12-11 PROCEDURE — 90651 9VHPV VACCINE 2/3 DOSE IM: CPT | Mod: JZ

## 2024-12-11 RX ORDER — NORETHINDRONE ACETATE AND ETHINYL ESTRADIOL 1MG-20(21)
1 KIT ORAL DAILY
Qty: 84 TABLET | Refills: 3 | Status: SHIPPED | OUTPATIENT
Start: 2024-12-11

## 2024-12-11 ASSESSMENT — FIBROSIS 4 INDEX: FIB4 SCORE: 0.75

## 2024-12-11 NOTE — PROGRESS NOTES
Patient here for annual exam  Last pap done/result:  LMP:11/24/2024  BCM:None  Last mammogram, if applicable:  Phone number: [phone]  Pharmacy verified

## 2024-12-12 LAB
C TRACH DNA GENITAL QL NAA+PROBE: NEGATIVE
N GONORRHOEA DNA GENITAL QL NAA+PROBE: NEGATIVE
SPECIMEN SOURCE: NORMAL

## 2024-12-17 LAB
HPV I/H RISK 1 DNA SPEC QL NAA+PROBE: NOT DETECTED
SPECIMEN SOURCE: NORMAL
THINPREP PAP, CYTOLOGY NL11781: NORMAL

## 2025-04-29 ENCOUNTER — OFFICE VISIT (OUTPATIENT)
Dept: MEDICAL GROUP | Facility: PHYSICIAN GROUP | Age: 40
End: 2025-04-29
Payer: COMMERCIAL

## 2025-04-29 VITALS
SYSTOLIC BLOOD PRESSURE: 108 MMHG | OXYGEN SATURATION: 96 % | HEIGHT: 63 IN | HEART RATE: 78 BPM | TEMPERATURE: 97 F | BODY MASS INDEX: 25.34 KG/M2 | RESPIRATION RATE: 18 BRPM | WEIGHT: 143 LBS | DIASTOLIC BLOOD PRESSURE: 72 MMHG

## 2025-04-29 DIAGNOSIS — T26.90XA CHEMICAL BURN OF EYE: ICD-10-CM

## 2025-04-29 ASSESSMENT — PATIENT HEALTH QUESTIONNAIRE - PHQ9: CLINICAL INTERPRETATION OF PHQ2 SCORE: 0
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
## 2025-04-29 NOTE — Clinical Note
REFERRAL APPROVAL NOTICE         Sent on April 29, 2025                   Sergio Siegel  [address]  [place] [address]                   Dear Ms. Siegel,    After a careful review of the medical information and benefit coverage, [facility] has processed your referral. See below for additional details.    If applicable, you must be actively enrolled with your insurance for coverage of the authorized service. If you have any questions regarding your coverage, please contact your insurance directly.    REFERRAL INFORMATION   Referral #:  [id number]  Referred-To Provider    Referred-By Provider:  Optometry    Claire Ram P.A.-C.   [facility]      [address]  [facility] [id number] [phone] [address] [place] [address]  [facility] [address]  [phone]    Referral Start Date:  04/29/2025  Referral End Date:   04/29/2026             SCHEDULING  If you do not already have an appointment, please call [phone] to make an appointment.     MORE INFORMATION  If you do not already have a [employer] account, sign up at: [email or web address]  You can access your medical information, make appointments, see lab results, billing information, and more.  If you have questions regarding this referral, please contact  the [facility] Referrals department at:             [phone]. Monday - Friday 8:00AM - 5:00PM.     Sincerely,    [facility]